# Patient Record
Sex: MALE | Race: ASIAN | NOT HISPANIC OR LATINO | Employment: UNEMPLOYED
[De-identification: names, ages, dates, MRNs, and addresses within clinical notes are randomized per-mention and may not be internally consistent; named-entity substitution may affect disease eponyms.]

---

## 2018-02-21 ENCOUNTER — HOSPITAL (OUTPATIENT)
Dept: OTHER | Age: 38
End: 2018-02-21
Attending: PLASTIC SURGERY

## 2018-02-21 LAB
GLUCOSE BLDC GLUCOMTR-MCNC: 120 MG/DL (ref 65–99)
GLUCOSE BLDC GLUCOMTR-MCNC: 125 MG/DL (ref 65–99)
GLUCOSE BLDC GLUCOMTR-MCNC: 141 MG/DL (ref 65–99)
GLUCOSE BLDC GLUCOMTR-MCNC: 152 MG/DL (ref 65–99)

## 2018-02-22 LAB
GLUCOSE BLDC GLUCOMTR-MCNC: 137 MG/DL (ref 65–99)
GLUCOSE BLDC GLUCOMTR-MCNC: 157 MG/DL (ref 65–99)

## 2020-12-24 ENCOUNTER — HOSPITAL ENCOUNTER (OUTPATIENT)
Dept: GENERAL RADIOLOGY | Age: 40
Discharge: HOME | End: 2020-12-24
Attending: INTERNAL MEDICINE

## 2020-12-24 DIAGNOSIS — R05.9 COUGH: ICD-10-CM

## 2020-12-24 PROCEDURE — 71046 X-RAY EXAM CHEST 2 VIEWS: CPT

## 2021-06-20 ENCOUNTER — HOSPITAL ENCOUNTER (EMERGENCY)
Age: 41
Discharge: HOME | End: 2021-06-20
Attending: FAMILY MEDICINE
Payer: COMMERCIAL

## 2021-06-20 VITALS
DIASTOLIC BLOOD PRESSURE: 78 MMHG | HEIGHT: 65 IN | SYSTOLIC BLOOD PRESSURE: 132 MMHG | WEIGHT: 160 LBS | TEMPERATURE: 98.8 F | BODY MASS INDEX: 26.66 KG/M2 | RESPIRATION RATE: 16 BRPM | HEART RATE: 98 BPM | OXYGEN SATURATION: 99 %

## 2021-06-20 DIAGNOSIS — M25.512 LEFT SHOULDER PAIN, UNSPECIFIED CHRONICITY: ICD-10-CM

## 2021-06-20 DIAGNOSIS — M54.12 CERVICAL RADICULOPATHY: Primary | ICD-10-CM

## 2021-06-20 PROCEDURE — 99283 EMERGENCY DEPT VISIT LOW MDM: CPT

## 2021-06-20 PROCEDURE — 6360000002 HC RX W HCPCS: Performed by: FAMILY MEDICINE

## 2021-06-20 PROCEDURE — 6370000000 HC RX 637 (ALT 250 FOR IP): Performed by: FAMILY MEDICINE

## 2021-06-20 PROCEDURE — 96372 THER/PROPH/DIAG INJ SC/IM: CPT

## 2021-06-20 RX ORDER — CYCLOBENZAPRINE HCL 10 MG
10 TABLET ORAL ONCE
Status: COMPLETED | OUTPATIENT
Start: 2021-06-20 | End: 2021-06-20

## 2021-06-20 RX ORDER — ACETAMINOPHEN 500 MG
1000 TABLET ORAL EVERY 8 HOURS PRN
Qty: 50 TABLET | Refills: 0 | Status: SHIPPED | OUTPATIENT
Start: 2021-06-20 | End: 2021-06-20 | Stop reason: SDUPTHER

## 2021-06-20 RX ORDER — ACETAMINOPHEN 500 MG
1000 TABLET ORAL ONCE
Status: COMPLETED | OUTPATIENT
Start: 2021-06-20 | End: 2021-06-20

## 2021-06-20 RX ORDER — NAPROXEN 500 MG/1
500 TABLET ORAL 2 TIMES DAILY
Qty: 14 TABLET | Refills: 0 | Status: SHIPPED | OUTPATIENT
Start: 2021-06-20 | End: 2021-06-27

## 2021-06-20 RX ORDER — NAPROXEN 500 MG/1
500 TABLET ORAL 2 TIMES DAILY
Qty: 14 TABLET | Refills: 0 | Status: SHIPPED | OUTPATIENT
Start: 2021-06-20 | End: 2021-06-20 | Stop reason: SDUPTHER

## 2021-06-20 RX ORDER — CYCLOBENZAPRINE HCL 10 MG
10 TABLET ORAL 3 TIMES DAILY PRN
Qty: 12 TABLET | Refills: 0 | Status: SHIPPED | OUTPATIENT
Start: 2021-06-20 | End: 2021-06-30

## 2021-06-20 RX ORDER — CYCLOBENZAPRINE HCL 10 MG
10 TABLET ORAL 3 TIMES DAILY PRN
Qty: 12 TABLET | Refills: 0 | Status: SHIPPED | OUTPATIENT
Start: 2021-06-20 | End: 2021-06-20 | Stop reason: SDUPTHER

## 2021-06-20 RX ORDER — ACETAMINOPHEN 500 MG
1000 TABLET ORAL EVERY 8 HOURS PRN
Qty: 50 TABLET | Refills: 0 | Status: SHIPPED | OUTPATIENT
Start: 2021-06-20

## 2021-06-20 RX ORDER — KETOROLAC TROMETHAMINE 30 MG/ML
30 INJECTION, SOLUTION INTRAMUSCULAR; INTRAVENOUS ONCE
Status: COMPLETED | OUTPATIENT
Start: 2021-06-20 | End: 2021-06-20

## 2021-06-20 RX ADMIN — KETOROLAC TROMETHAMINE 30 MG: 30 INJECTION, SOLUTION INTRAMUSCULAR; INTRAVENOUS at 14:19

## 2021-06-20 RX ADMIN — ACETAMINOPHEN 1000 MG: 500 TABLET ORAL at 14:19

## 2021-06-20 RX ADMIN — CYCLOBENZAPRINE 10 MG: 10 TABLET, FILM COATED ORAL at 14:19

## 2021-06-20 ASSESSMENT — PAIN SCALES - GENERAL: PAINLEVEL_OUTOF10: 6

## 2021-06-20 NOTE — ED PROVIDER NOTES
[address] [facility]  Department of Emergency Medicine   ED  Encounter Note  Admit Date/RoomTime: 2021  1:35 PM  ED Room:     NAME: Tamiko Dawson  : 1980  MRN: [id number]     Chief Complaint:  Shoulder Pain (shoulder neck and back pain that started 1 week ago )    History of Present Illness       Tamiko Dawson is a 45 y.o. old male who presents to the emergency department by private vehicle, for non-traumatic Left shoulder pain which occured 1 week(s) prior to arrival.  The complaint is due to no known cause. Patient has no prior history of pain/injury with regards to today's visit. He is right handed. Patient has radiation of pain and also numbness     Since onset the symptoms have been persistent. His pain is aggraveated by any movement, any use of, certain movements or pressure on or palpation of painful area and relieved by rest of injured area, OTC NSAIDS and acetaminophen. He Has left lateral neck pain and numbness of Left thumb    ROS   Pertinent positives and negatives are stated within HPI, all other systems reviewed and are negative. Past Medical History:  has no past medical history on file. Surgical History:  has no past surgical history on file. Social History:      Family History: family history is not on file. Allergies: Patient has no known allergies. Physical Exam   Oxygen Saturation Interpretation: Normal.        ED Triage Vitals   BP Temp Temp Source Pulse Resp SpO2 Height Weight   21 1339 21 1339 21 1339 21 1339 21 1339 21 1339 21 1340 21 1340   (!) 140/74 98.8 °F (37.1 °C) Oral 106 16 96 % 5' 5\" (1.651 m) 160 lb (72.6 kg)         Constitutional:  Alert, development consistent with age. Neck:  Normal ROM. Supple. Left lateral tender limited right rotation which causes more pain on his shoulder. Left Shoulder: anterior, posterior.               Tenderness: none              Swelling: None.            Deformity: no deformity observed/palpated. ROM: full range with pain. Skin:  no wounds, erythema, or swelling. Neurovascular: Motor deficit: none. Sensory deficit: none. Pulse deficit: none. Capillary refill: normal.    Left Elbow:             Tenderness:  none. Swelling: None. Deformity: no deformity observed/palpated. ROM: full painless ROM. Skin:  no wounds, erythema, or swelling. Lymphatics: No lymphangitis or edema noted  DTRs decrease on left biceps  Neurological:  Oriented. Motor functions intact. Lab / Imaging Results   (All laboratory and radiology results have been personally reviewed by myself)  Labs:  No results found for this visit on 06/20/21. Imaging: All Radiology results interpreted by Radiologist unless otherwise noted. No orders to display     ED Course / Medical Decision Making     Medications   ketorolac (TORADOL) injection 30 mg (30 mg Intramuscular Given 6/20/21 1419)   acetaminophen (TYLENOL) tablet 1,000 mg (1,000 mg Oral Given 6/20/21 1419)   cyclobenzaprine (FLEXERIL) tablet 10 mg (10 mg Oral Given 6/20/21 1419)      Consult(s):   None    Procedure(s):   none    MDM:      Imaging was not obtained based on no suspicion for fracture / bony abnormality as per history/physical findings. Plan is subsequently for symptom control, limited use and  immobilization with appropriate outpatient follow-up. Patient has follow-up appointment with his PCP he does not want any imaging that may have to be repeated best for him is to have an MRI of cervical spine and nerve conduction study considering his decreased sensory at thumb left and his relative weakness as compared to his right hand.         Plan of Care/Counseling:  Moises Humphries MD reviewed today's visit with the patient and spouse / life partner in addition to providing specific

## 2023-02-17 ENCOUNTER — HOSPITAL ENCOUNTER (EMERGENCY)
Age: 43
Discharge: HOME | End: 2023-02-17
Attending: EMERGENCY MEDICINE
Payer: COMMERCIAL

## 2023-02-17 VITALS
RESPIRATION RATE: 16 BRPM | TEMPERATURE: 97.3 F | DIASTOLIC BLOOD PRESSURE: 74 MMHG | OXYGEN SATURATION: 100 % | WEIGHT: 160 LBS | BODY MASS INDEX: 26.66 KG/M2 | HEIGHT: 65 IN | SYSTOLIC BLOOD PRESSURE: 122 MMHG | HEART RATE: 88 BPM

## 2023-02-17 DIAGNOSIS — M54.12 CERVICAL RADICULOPATHY: Primary | ICD-10-CM

## 2023-02-17 LAB
EKG ATRIAL RATE: 101 BPM
EKG P AXIS: 51 DEGREES
EKG P-R INTERVAL: 152 MS
EKG Q-T INTERVAL: 340 MS
EKG QRS DURATION: 86 MS
EKG QTC CALCULATION (BAZETT): 440 MS
EKG R AXIS: 53 DEGREES
EKG T AXIS: 17 DEGREES
EKG VENTRICULAR RATE: 101 BPM

## 2023-02-17 PROCEDURE — 6360000002 HC RX W HCPCS: Performed by: EMERGENCY MEDICINE

## 2023-02-17 PROCEDURE — 99284 EMERGENCY DEPT VISIT MOD MDM: CPT

## 2023-02-17 PROCEDURE — 96372 THER/PROPH/DIAG INJ SC/IM: CPT

## 2023-02-17 PROCEDURE — 93005 ELECTROCARDIOGRAM TRACING: CPT

## 2023-02-17 RX ORDER — TESTOSTERONE 200 MG
0.35 PELLET (EA) IMPLANTATION WEEKLY
COMMUNITY

## 2023-02-17 RX ORDER — ORPHENADRINE CITRATE 100 MG/1
100 TABLET, EXTENDED RELEASE ORAL 2 TIMES DAILY
Qty: 20 TABLET | Refills: 0 | Status: SHIPPED | OUTPATIENT
Start: 2023-02-17 | End: 2023-02-27

## 2023-02-17 RX ORDER — LORATADINE 10 MG/1
10 CAPSULE, LIQUID FILLED ORAL DAILY
COMMUNITY

## 2023-02-17 RX ORDER — METHYLPREDNISOLONE 4 MG/1
TABLET ORAL
Qty: 1 KIT | Refills: 0 | Status: SHIPPED | OUTPATIENT
Start: 2023-02-17 | End: 2023-02-23

## 2023-02-17 RX ORDER — PIOGLITAZONEHYDROCHLORIDE 30 MG/1
30 TABLET ORAL DAILY
COMMUNITY

## 2023-02-17 RX ORDER — ORAL SEMAGLUTIDE 14 MG/1
TABLET ORAL 2 TIMES DAILY
COMMUNITY

## 2023-02-17 RX ORDER — HYDROCHLOROTHIAZIDE 12.5 MG/1
12.5 CAPSULE, GELATIN COATED ORAL 2 TIMES DAILY
COMMUNITY

## 2023-02-17 RX ORDER — KETOROLAC TROMETHAMINE 30 MG/ML
30 INJECTION, SOLUTION INTRAMUSCULAR; INTRAVENOUS ONCE
Status: COMPLETED | OUTPATIENT
Start: 2023-02-17 | End: 2023-02-17

## 2023-02-17 RX ORDER — TRAZODONE HYDROCHLORIDE 50 MG/1
50 TABLET ORAL NIGHTLY
COMMUNITY

## 2023-02-17 RX ORDER — MINOCYCLINE HYDROCHLORIDE 100 MG/1
100 CAPSULE ORAL 2 TIMES DAILY
COMMUNITY

## 2023-02-17 RX ORDER — EMPAGLIFLOZIN AND METFORMIN HYDROCHLORIDE 5; 1000 MG/1; MG/1
TABLET ORAL 2 TIMES DAILY
COMMUNITY

## 2023-02-17 RX ORDER — ATORVASTATIN CALCIUM 10 MG/1
10 TABLET, FILM COATED ORAL DAILY
COMMUNITY

## 2023-02-17 RX ORDER — LISINOPRIL 20 MG/1
20 TABLET ORAL DAILY
COMMUNITY

## 2023-02-17 RX ADMIN — KETOROLAC TROMETHAMINE 30 MG: 30 INJECTION, SOLUTION INTRAMUSCULAR at 12:30

## 2023-02-17 ASSESSMENT — PAIN DESCRIPTION - DESCRIPTORS
DESCRIPTORS: ACHING;TIGHTNESS;DISCOMFORT
DESCRIPTORS: ACHING;DISCOMFORT;THROBBING

## 2023-02-17 ASSESSMENT — PAIN DESCRIPTION - ONSET: ONSET: ON-GOING

## 2023-02-17 ASSESSMENT — PAIN DESCRIPTION - FREQUENCY: FREQUENCY: CONTINUOUS

## 2023-02-17 ASSESSMENT — PAIN DESCRIPTION - LOCATION
LOCATION: NECK
LOCATION: NECK
LOCATION: NECK;SHOULDER;ARM

## 2023-02-17 ASSESSMENT — PAIN DESCRIPTION - PAIN TYPE: TYPE: ACUTE PAIN

## 2023-02-17 ASSESSMENT — PAIN SCALES - GENERAL
PAINLEVEL_OUTOF10: 6
PAINLEVEL_OUTOF10: 8
PAINLEVEL_OUTOF10: 8

## 2023-02-17 ASSESSMENT — PAIN DESCRIPTION - ORIENTATION: ORIENTATION: RIGHT

## 2023-02-17 ASSESSMENT — PAIN - FUNCTIONAL ASSESSMENT: PAIN_FUNCTIONAL_ASSESSMENT: 0-10

## 2023-02-17 NOTE — ED PROVIDER NOTES
HPI:  2/17/23, Time: 12:26 PM SAMANTA Pedraza is a 40 y.o. male presenting to the ED for right-sided neck pain beginning about a week and a half ago. Patient denies any falls or specific trauma to his neck but states that it did began after lifting something heavy. No associated numbness or tingling to his arms. Pain is located in the right side of his neck and shoulder radiating down his arm. He had similar symptoms last June. He followed up with his PCP at that time and underwent outpatient CT cervical spine which showed no acute findings. The initial plan was to obtain an MRI of his neck; however, the patient underwent physical therapy with resolution of his symptoms. MRI was not to be needed at that time. He states his symptoms flared up once again about a week and a half ago. He has been taking Motrin and Tylenol with some relief of his symptoms. He denies any fevers, chest pain, difficulty breathing, bowel or bladder incontinence or retention, saddle anesthesia, or extremity numbness or weakness. Review of Systems:  Complete ROS otherwise negative unless stated in HPI.    --------------------------------------------- PAST HISTORY ---------------------------------------------  Past Medical History:  has a past medical history of Degenerative disc disease, lumbar, Diabetes mellitus ([facility] [address] and Hypertension. Past Surgical History:  has a past surgical history that includes Carpal tunnel release; lumbar discectomy; and Mastectomy. Social History:  reports that he has never smoked. He has never used smokeless tobacco. He reports that he does not drink alcohol and does not use drugs. Family History: family history is not on file. The patients home medications have been reviewed. Allergies: Patient has no known allergies.     -------------------------------------------------- RESULTS -------------------------------------------------  All laboratory and radiology results have been personally reviewed by myself   LABS:  Results for orders placed or performed during the hospital encounter of 02/17/23   EKG 12 Lead   Result Value Ref Range    Ventricular Rate 101 BPM    Atrial Rate 101 BPM    P-R Interval 152 ms    QRS Duration 86 ms    Q-T Interval 340 ms    QTc Calculation (Bazett) 440 ms    P Axis 51 degrees    R Axis 53 degrees    T Axis 17 degrees       RADIOLOGY:  Interpreted by Radiologist.  No orders to display       ------------------------- NURSING NOTES AND VITALS REVIEWED ---------------------------   The nursing notes within the ED encounter and vital signs as below have been reviewed. /74   Pulse 88   Temp 97.3 °F (36.3 °C) (Oral)   Resp 16   Ht 5' 5\" (1.651 m)   Wt 160 lb (72.6 kg)   SpO2 100%   BMI 26.63 kg/m²   Oxygen Saturation Interpretation: Normal      ---------------------------------------------------PHYSICAL EXAM--------------------------------------      Constitutional/General: Alert and oriented x3, appears uncomfortable, non toxic in NAD  Head: Normocephalic and atraumatic  Eyes: EOMI  Mouth: Oropharynx clear, handling secretions, no trismus  Neck: Supple, full ROM, No midline cervical spine tenderness, tenderness to right paraspinal muscles and right trapezius, 5/5 strength to bilateral upper extremities with normal sensation to extremities. Strong radial pulses bilaterally  Pulmonary: Lungs clear to auscultation bilaterally, no wheezes, rales, or rhonchi. Not in respiratory distress  Cardiovascular:  Regular rate and rhythm  Extremities: Moves all extremities x 4. Warm and well perfused  Skin: warm and dry without rash  Neurologic: GCS 15, no focal motor or sensory deficits   Psych: Normal Affect.  Behavior normal.      ------------------------------ ED COURSE/MEDICAL DECISION MAKING----------------------  Medications   ketorolac (TORADOL) injection 30 mg (30 mg IntraMUSCular Given 2/17/23 0341)       Medical Decision Making/ED COURSE:    History From: patient     Patient is a 40 y.o. male presenting to the ED for acute on chronic right sided neck pain radiating down his arm, moderate in severity. In the ED, patient was hemodynamically stable and afebrile. On exam, patient had no midline cervical spine tenderness but did have tenderness to his right paraspinal muscles and right trapezius. He was neurovascularly intact. Considered obtaining CT cervical spine. Patient has had no trauma to his neck. He has no midline tenderness. He had similar symptoms last year and had a negative CT neck at that time. I do not believe that repeat CT neck is indicated. I discussed with the patient that MRI is a more appropriate test due to concern for pinched nerve with cervical radiculopathy. He has no signs or symptoms of spinal cord compression, so emergent MRI is not indicated at this time. Through shared decision making, will defer any emergent imaging. Plan for symptomatic control and steroids. I advised that he call his PCP as soon as possible to reestablish with physical therapy and possibly get an outpatient MRI to further evaluate his symptoms. Will be prescribed a Medrol Dosepak and Norflex for home. Advised to monitor his blood sugars while on Medrol Dosepak because it can cause hyperglycemia. Advise no driving while taking Norflex. Supportive care measures and strict ED return precautions discussed. CONSULTS: (Who and What was discussed)  None      Social Determinants of Health : None    Chronic Conditions affecting care:    has a past medical history of Degenerative disc disease, lumbar, Diabetes mellitus ([facility] [address] and Hypertension. ED Course as of 02/17/23 1436   Fri Feb 17, 2023   1321 EKG:   This EKG is signed and interpreted by me, Dr. Cuong Matta MD    Rate: 101  Rhythm: Sinus  Interpretation: Sinus tachycardia, normal AL interval, normal QRS, normal axis, normal QT interval, no acute ST or T wave changes  Comparison: no previous EKG available   [JA]      ED Course User Index  [JA] Hedy Barros MD       Patient remained hemodynamically stable throughout ED course. Discharge Medication List as of 2/17/2023  1:21 PM        START taking these medications    Details   methylPREDNISolone (MEDROL, SHAYAN,) 4 MG tablet Take by mouth., Disp-1 kit, R-0Normal      orphenadrine (NORFLEX) 100 MG extended release tablet Take 1 tablet by mouth 2 times daily for 10 days, Disp-20 tablet, R-0Normal               Counseling: The emergency provider has spoken with the patient and discussed todays results, in addition to providing specific details for the plan of care and counseling regarding the diagnosis and prognosis. Questions are answered at this time and they are agreeable with the plan.      --------------------------------- IMPRESSION AND DISPOSITION ---------------------------------    IMPRESSION  1. Cervical radiculopathy        DISPOSITION  Disposition: Discharge to home  Patient condition is stable      NOTE: This report was transcribed using voice recognition software.  Every effort was made to ensure accuracy; however, inadvertent computerized transcription errors may be present    I, Hedy Barros MD, am the primary provider of this record        Hedy Barros MD  02/17/23 9714

## 2024-09-01 ENCOUNTER — HOSPITAL ENCOUNTER (EMERGENCY)
Age: 44
Discharge: HOME | End: 2024-09-01
Payer: COMMERCIAL

## 2024-09-01 ENCOUNTER — APPOINTMENT (OUTPATIENT)
Dept: GENERAL RADIOLOGY | Age: 44
End: 2024-09-01
Payer: COMMERCIAL

## 2024-09-01 VITALS
BODY MASS INDEX: 26.66 KG/M2 | WEIGHT: 160 LBS | DIASTOLIC BLOOD PRESSURE: 87 MMHG | RESPIRATION RATE: 18 BRPM | TEMPERATURE: 98.6 F | SYSTOLIC BLOOD PRESSURE: 139 MMHG | OXYGEN SATURATION: 100 % | HEIGHT: 65 IN | HEART RATE: 93 BPM

## 2024-09-01 DIAGNOSIS — R42 DIZZINESS: Primary | ICD-10-CM

## 2024-09-01 DIAGNOSIS — E87.6 HYPOKALEMIA: ICD-10-CM

## 2024-09-01 DIAGNOSIS — R25.2 MUSCLE CRAMPING: ICD-10-CM

## 2024-09-01 LAB
ALBUMIN SERPL-MCNC: 4.9 G/DL (ref 3.5–5.2)
ALP SERPL-CCNC: 59 U/L (ref 40–129)
ALT SERPL-CCNC: 26 U/L (ref 0–40)
ANION GAP SERPL CALCULATED.3IONS-SCNC: 16 MMOL/L (ref 7–16)
AST SERPL-CCNC: 27 U/L (ref 0–39)
BASOPHILS # BLD: 0.04 K/UL (ref 0–0.2)
BASOPHILS NFR BLD: 1 % (ref 0–2)
BILIRUB SERPL-MCNC: 0.5 MG/DL (ref 0–1.2)
BILIRUB UR QL STRIP: NEGATIVE
BUN SERPL-MCNC: 25 MG/DL (ref 6–20)
CALCIUM SERPL-MCNC: 9.4 MG/DL (ref 8.6–10.2)
CHLORIDE SERPL-SCNC: 97 MMOL/L (ref 98–107)
CK SERPL-CCNC: 530 U/L (ref 20–200)
CLARITY UR: CLEAR
CO2 SERPL-SCNC: 22 MMOL/L (ref 22–29)
COLOR UR: YELLOW
CREAT SERPL-MCNC: 0.9 MG/DL (ref 0.7–1.2)
D-DIMER QUANTITATIVE: <200 NG/ML DDU (ref 0–230)
EOSINOPHIL # BLD: 0.08 K/UL (ref 0.05–0.5)
EOSINOPHILS RELATIVE PERCENT: 1 % (ref 0–6)
ERYTHROCYTE [DISTWIDTH] IN BLOOD BY AUTOMATED COUNT: 12 % (ref 11.5–15)
GFR, ESTIMATED: >90 ML/MIN/1.73M2
GLUCOSE SERPL-MCNC: 90 MG/DL (ref 74–99)
GLUCOSE UR STRIP-MCNC: >=1000 MG/DL
HCT VFR BLD AUTO: 45.4 % (ref 37–54)
HGB BLD-MCNC: 15.4 G/DL (ref 12.5–16.5)
HGB UR QL STRIP.AUTO: ABNORMAL
IMM GRANULOCYTES # BLD AUTO: <0.03 K/UL (ref 0–0.58)
IMM GRANULOCYTES NFR BLD: 0 % (ref 0–5)
KETONES UR STRIP-MCNC: 15 MG/DL
LACTATE BLDV-SCNC: 1 MMOL/L (ref 0.5–2.2)
LEUKOCYTE ESTERASE UR QL STRIP: NEGATIVE
LYMPHOCYTES NFR BLD: 1.44 K/UL (ref 1.5–4)
LYMPHOCYTES RELATIVE PERCENT: 21 % (ref 20–42)
MAGNESIUM SERPL-MCNC: 2.1 MG/DL (ref 1.6–2.6)
MCH RBC QN AUTO: 30.7 PG (ref 26–35)
MCHC RBC AUTO-ENTMCNC: 33.9 G/DL (ref 32–34.5)
MCV RBC AUTO: 90.6 FL (ref 80–99.9)
MONOCYTES NFR BLD: 0.75 K/UL (ref 0.1–0.95)
MONOCYTES NFR BLD: 11 % (ref 2–12)
NEUTROPHILS NFR BLD: 67 % (ref 43–80)
NEUTS SEG NFR BLD: 4.65 K/UL (ref 1.8–7.3)
NITRITE UR QL STRIP: NEGATIVE
PH UR STRIP: 5.5 [PH] (ref 5–9)
PLATELET # BLD AUTO: 258 K/UL (ref 130–450)
PMV BLD AUTO: 9.6 FL (ref 7–12)
POTASSIUM SERPL-SCNC: 3.3 MMOL/L (ref 3.5–5)
PROT SERPL-MCNC: 7.8 G/DL (ref 6.4–8.3)
PROT UR STRIP-MCNC: NEGATIVE MG/DL
RBC # BLD AUTO: 5.01 M/UL (ref 3.8–5.8)
RBC #/AREA URNS HPF: NORMAL /HPF
SODIUM SERPL-SCNC: 135 MMOL/L (ref 132–146)
SP GR UR STRIP: 1.02 (ref 1–1.03)
TROPONIN I SERPL HS-MCNC: 6 NG/L (ref 0–11)
UROBILINOGEN UR STRIP-ACNC: 0.2 EU/DL (ref 0–1)
WBC #/AREA URNS HPF: NORMAL /HPF
WBC OTHER # BLD: 7 K/UL (ref 4.5–11.5)

## 2024-09-01 PROCEDURE — 84484 ASSAY OF TROPONIN QUANT: CPT

## 2024-09-01 PROCEDURE — 93005 ELECTROCARDIOGRAM TRACING: CPT | Performed by: NURSE PRACTITIONER

## 2024-09-01 PROCEDURE — 80053 COMPREHEN METABOLIC PANEL: CPT

## 2024-09-01 PROCEDURE — 2580000003 HC RX 258: Performed by: NURSE PRACTITIONER

## 2024-09-01 PROCEDURE — 81001 URINALYSIS AUTO W/SCOPE: CPT

## 2024-09-01 PROCEDURE — 82550 ASSAY OF CK (CPK): CPT

## 2024-09-01 PROCEDURE — 71046 X-RAY EXAM CHEST 2 VIEWS: CPT

## 2024-09-01 PROCEDURE — 99285 EMERGENCY DEPT VISIT HI MDM: CPT

## 2024-09-01 PROCEDURE — 83735 ASSAY OF MAGNESIUM: CPT

## 2024-09-01 PROCEDURE — 85379 FIBRIN DEGRADATION QUANT: CPT

## 2024-09-01 PROCEDURE — 85025 COMPLETE CBC W/AUTO DIFF WBC: CPT

## 2024-09-01 PROCEDURE — 6370000000 HC RX 637 (ALT 250 FOR IP): Performed by: NURSE PRACTITIONER

## 2024-09-01 PROCEDURE — 83605 ASSAY OF LACTIC ACID: CPT

## 2024-09-01 RX ORDER — IBUPROFEN 800 MG/1
800 TABLET, FILM COATED ORAL EVERY 6 HOURS PRN
Qty: 21 TABLET | Refills: 0 | Status: SHIPPED | OUTPATIENT
Start: 2024-09-01

## 2024-09-01 RX ORDER — METHOCARBAMOL 500 MG/1
500 TABLET, FILM COATED ORAL 4 TIMES DAILY
Qty: 20 TABLET | Refills: 0 | Status: SHIPPED | OUTPATIENT
Start: 2024-09-01 | End: 2024-09-06

## 2024-09-01 RX ORDER — POTASSIUM CHLORIDE 1500 MG/1
20 TABLET, EXTENDED RELEASE ORAL ONCE
Status: COMPLETED | OUTPATIENT
Start: 2024-09-01 | End: 2024-09-01

## 2024-09-01 RX ORDER — 0.9 % SODIUM CHLORIDE 0.9 %
1000 INTRAVENOUS SOLUTION INTRAVENOUS ONCE
Status: COMPLETED | OUTPATIENT
Start: 2024-09-01 | End: 2024-09-01

## 2024-09-01 RX ADMIN — SODIUM CHLORIDE 1000 ML: 9 INJECTION, SOLUTION INTRAVENOUS at 18:36

## 2024-09-01 RX ADMIN — POTASSIUM CHLORIDE 20 MEQ: 1500 TABLET, EXTENDED RELEASE ORAL at 18:35

## 2024-09-01 ASSESSMENT — PAIN SCALES - GENERAL: PAINLEVEL_OUTOF10: 0

## 2024-09-01 ASSESSMENT — PAIN - FUNCTIONAL ASSESSMENT: PAIN_FUNCTIONAL_ASSESSMENT: NONE - DENIES PAIN

## 2024-09-01 ASSESSMENT — LIFESTYLE VARIABLES
HOW MANY STANDARD DRINKS CONTAINING ALCOHOL DO YOU HAVE ON A TYPICAL DAY: PATIENT DOES NOT DRINK
HOW OFTEN DO YOU HAVE A DRINK CONTAINING ALCOHOL: NEVER

## 2024-09-01 NOTE — ED PROVIDER NOTES
[facility] EMERGENCY DEPARTMENT  ED  Encounter Note  Admit Date/RoomTime: 9/1/2024  5:00 PM  ED Room: 03/03  Independent SHRADDHA Visit.     HPI:  9/1/24,   Time: 6:40 PM EDT         Valeria Cummins is a 44 y.o. adult presenting to the ED for muscle cramping, beginning few months ago.  The complaint has been intermittent, mild in severity, and worsened by nothing.  Patient presents with complaints of intermittent episodes of muscle cramping in the bilateral lower legs which has been present for the last couple months.  Also states he had intermittent episodes of dizziness which has been intermittent.  He states earlier today while at work he began having some increased dizziness.  Denies any chest pain or shortness of breath.    ROS:   Pertinent positives and negatives are stated within HPI, all other systems reviewed and are negative.  --------------------------------------------- PAST HISTORY ---------------------------------------------  Past Medical History:  has a past medical history of Degenerative disc disease, lumbar, Diabetes mellitus (HCC), and Hypertension.    Past Surgical History:  has a past surgical history that includes Carpal tunnel release; lumbar discectomy; and Mastectomy.    Social History:  reports that he has never smoked. He has never used smokeless tobacco. He reports that he does not drink alcohol and does not use drugs.    Family History: family history is not on file.     The patient’s home medications have been reviewed.    Allergies: Patient has no known allergies.    -------------------------------------------------- RESULTS -------------------------------------------------  All laboratory and radiology results have been personally reviewed by myself   LABS:  Results for orders placed or performed during the hospital encounter of 09/01/24   Troponin   Result Value Ref Range    Troponin, High Sensitivity 6 0 - 11 ng/L   CBC with Auto Differential   Result

## 2024-09-04 LAB
EKG ATRIAL RATE: 90 BPM
EKG P AXIS: 58 DEGREES
EKG P-R INTERVAL: 160 MS
EKG Q-T INTERVAL: 366 MS
EKG QRS DURATION: 72 MS
EKG QTC CALCULATION (BAZETT): 447 MS
EKG R AXIS: 58 DEGREES
EKG T AXIS: 47 DEGREES
EKG VENTRICULAR RATE: 90 BPM

## 2024-09-04 PROCEDURE — 93010 ELECTROCARDIOGRAM REPORT: CPT | Performed by: INTERNAL MEDICINE

## 2024-09-05 ENCOUNTER — HOSPITAL ENCOUNTER (EMERGENCY)
Age: 44
Discharge: HOME | End: 2024-09-06
Attending: STUDENT IN AN ORGANIZED HEALTH CARE EDUCATION/TRAINING PROGRAM
Payer: COMMERCIAL

## 2024-09-05 ENCOUNTER — APPOINTMENT (OUTPATIENT)
Dept: CT IMAGING | Age: 44
End: 2024-09-05
Payer: COMMERCIAL

## 2024-09-05 DIAGNOSIS — N30.00 ACUTE CYSTITIS WITHOUT HEMATURIA: Primary | ICD-10-CM

## 2024-09-05 DIAGNOSIS — E87.6 HYPOKALEMIA: ICD-10-CM

## 2024-09-05 LAB
ALBUMIN SERPL-MCNC: 4 G/DL (ref 3.5–5.2)
ALP SERPL-CCNC: 51 U/L (ref 40–129)
ALT SERPL-CCNC: 31 U/L (ref 0–40)
ANION GAP SERPL CALCULATED.3IONS-SCNC: 12 MMOL/L (ref 7–16)
AST SERPL-CCNC: 29 U/L (ref 0–39)
BACTERIA URNS QL MICRO: ABNORMAL
BASOPHILS # BLD: 0.07 K/UL (ref 0–0.2)
BASOPHILS NFR BLD: 1 % (ref 0–2)
BILIRUB SERPL-MCNC: 0.2 MG/DL (ref 0–1.2)
BILIRUB UR QL STRIP: NEGATIVE
BNP SERPL-MCNC: <26 PG/ML (ref 0–125)
BUN SERPL-MCNC: 19 MG/DL (ref 6–20)
CALCIUM SERPL-MCNC: 8.9 MG/DL (ref 8.6–10.2)
CHLORIDE SERPL-SCNC: 103 MMOL/L (ref 98–107)
CK SERPL-CCNC: 577 U/L (ref 20–200)
CLARITY UR: CLEAR
CO2 SERPL-SCNC: 24 MMOL/L (ref 22–29)
COLOR UR: YELLOW
CREAT SERPL-MCNC: 1 MG/DL (ref 0.7–1.2)
EOSINOPHIL # BLD: 0.22 K/UL (ref 0.05–0.5)
EOSINOPHILS RELATIVE PERCENT: 3 % (ref 0–6)
ERYTHROCYTE [DISTWIDTH] IN BLOOD BY AUTOMATED COUNT: 12 % (ref 11.5–15)
GFR, ESTIMATED: >90 ML/MIN/1.73M2
GLUCOSE SERPL-MCNC: 144 MG/DL (ref 74–99)
GLUCOSE UR STRIP-MCNC: >=1000 MG/DL
HCT VFR BLD AUTO: 39.9 % (ref 37–54)
HGB BLD-MCNC: 13.7 G/DL (ref 12.5–16.5)
HGB UR QL STRIP.AUTO: NEGATIVE
IMM GRANULOCYTES # BLD AUTO: <0.03 K/UL (ref 0–0.58)
IMM GRANULOCYTES NFR BLD: 0 % (ref 0–5)
KETONES UR STRIP-MCNC: ABNORMAL MG/DL
LEUKOCYTE ESTERASE UR QL STRIP: ABNORMAL
LYMPHOCYTES NFR BLD: 1.58 K/UL (ref 1.5–4)
LYMPHOCYTES RELATIVE PERCENT: 23 % (ref 20–42)
MAGNESIUM SERPL-MCNC: 2.1 MG/DL (ref 1.6–2.6)
MCH RBC QN AUTO: 30.6 PG (ref 26–35)
MCHC RBC AUTO-ENTMCNC: 34.3 G/DL (ref 32–34.5)
MCV RBC AUTO: 89.1 FL (ref 80–99.9)
MONOCYTES NFR BLD: 0.73 K/UL (ref 0.1–0.95)
MONOCYTES NFR BLD: 11 % (ref 2–12)
NEUTROPHILS NFR BLD: 62 % (ref 43–80)
NEUTS SEG NFR BLD: 4.24 K/UL (ref 1.8–7.3)
NITRITE UR QL STRIP: NEGATIVE
PH UR STRIP: 6.5 [PH] (ref 5–9)
PLATELET # BLD AUTO: 260 K/UL (ref 130–450)
PMV BLD AUTO: 9.7 FL (ref 7–12)
POTASSIUM SERPL-SCNC: 3.4 MMOL/L (ref 3.5–5)
PROT SERPL-MCNC: 6.3 G/DL (ref 6.4–8.3)
PROT UR STRIP-MCNC: NEGATIVE MG/DL
RBC # BLD AUTO: 4.48 M/UL (ref 3.8–5.8)
RBC #/AREA URNS HPF: ABNORMAL /HPF
SODIUM SERPL-SCNC: 139 MMOL/L (ref 132–146)
SP GR UR STRIP: 1.02 (ref 1–1.03)
TROPONIN I SERPL HS-MCNC: 9 NG/L (ref 0–11)
UROBILINOGEN UR STRIP-ACNC: 0.2 EU/DL (ref 0–1)
WBC #/AREA URNS HPF: ABNORMAL /HPF
WBC OTHER # BLD: 6.9 K/UL (ref 4.5–11.5)

## 2024-09-05 PROCEDURE — 85025 COMPLETE CBC W/AUTO DIFF WBC: CPT

## 2024-09-05 PROCEDURE — 84484 ASSAY OF TROPONIN QUANT: CPT

## 2024-09-05 PROCEDURE — 83880 ASSAY OF NATRIURETIC PEPTIDE: CPT

## 2024-09-05 PROCEDURE — 80053 COMPREHEN METABOLIC PANEL: CPT

## 2024-09-05 PROCEDURE — 87636 SARSCOV2 & INF A&B AMP PRB: CPT

## 2024-09-05 PROCEDURE — 84439 ASSAY OF FREE THYROXINE: CPT

## 2024-09-05 PROCEDURE — 99284 EMERGENCY DEPT VISIT MOD MDM: CPT

## 2024-09-05 PROCEDURE — 84443 ASSAY THYROID STIM HORMONE: CPT

## 2024-09-05 PROCEDURE — 80307 DRUG TEST PRSMV CHEM ANLYZR: CPT

## 2024-09-05 PROCEDURE — 70450 CT HEAD/BRAIN W/O DYE: CPT

## 2024-09-05 PROCEDURE — 82550 ASSAY OF CK (CPK): CPT

## 2024-09-05 PROCEDURE — 93005 ELECTROCARDIOGRAM TRACING: CPT | Performed by: STUDENT IN AN ORGANIZED HEALTH CARE EDUCATION/TRAINING PROGRAM

## 2024-09-05 PROCEDURE — 80179 DRUG ASSAY SALICYLATE: CPT

## 2024-09-05 PROCEDURE — 81001 URINALYSIS AUTO W/SCOPE: CPT

## 2024-09-05 PROCEDURE — 80143 DRUG ASSAY ACETAMINOPHEN: CPT

## 2024-09-05 PROCEDURE — 83735 ASSAY OF MAGNESIUM: CPT

## 2024-09-05 PROCEDURE — G0480 DRUG TEST DEF 1-7 CLASSES: HCPCS

## 2024-09-05 RX ORDER — 0.9 % SODIUM CHLORIDE 0.9 %
1000 INTRAVENOUS SOLUTION INTRAVENOUS ONCE
Status: COMPLETED | OUTPATIENT
Start: 2024-09-06 | End: 2024-09-06

## 2024-09-05 ASSESSMENT — PAIN - FUNCTIONAL ASSESSMENT: PAIN_FUNCTIONAL_ASSESSMENT: NONE - DENIES PAIN

## 2024-09-06 VITALS
BODY MASS INDEX: 26.66 KG/M2 | SYSTOLIC BLOOD PRESSURE: 132 MMHG | WEIGHT: 160 LBS | RESPIRATION RATE: 20 BRPM | DIASTOLIC BLOOD PRESSURE: 63 MMHG | HEART RATE: 88 BPM | OXYGEN SATURATION: 99 % | HEIGHT: 65 IN | TEMPERATURE: 98.7 F

## 2024-09-06 LAB
AMPHET UR QL SCN: NEGATIVE
APAP SERPL-MCNC: <5 UG/ML (ref 10–30)
BARBITURATES UR QL SCN: NEGATIVE
BENZODIAZ UR QL: NEGATIVE
BUPRENORPHINE UR QL: NEGATIVE
CANNABINOIDS UR QL SCN: POSITIVE
COCAINE UR QL SCN: NEGATIVE
EKG ATRIAL RATE: 93 BPM
EKG P AXIS: 51 DEGREES
EKG P-R INTERVAL: 146 MS
EKG Q-T INTERVAL: 364 MS
EKG QRS DURATION: 86 MS
EKG QTC CALCULATION (BAZETT): 452 MS
EKG R AXIS: 41 DEGREES
EKG T AXIS: 33 DEGREES
EKG VENTRICULAR RATE: 93 BPM
ETHANOLAMINE SERPL-MCNC: <10 MG/DL (ref 0–0.08)
FENTANYL UR QL: NEGATIVE
FLUAV RNA RESP QL NAA+PROBE: NOT DETECTED
FLUBV RNA RESP QL NAA+PROBE: NOT DETECTED
METHADONE UR QL: NEGATIVE
OPIATES UR QL SCN: NEGATIVE
OXYCODONE UR QL SCN: NEGATIVE
PCP UR QL SCN: NEGATIVE
SALICYLATES SERPL-MCNC: <0.3 MG/DL (ref 0–30)
SARS-COV-2 RNA RESP QL NAA+PROBE: NOT DETECTED
SOURCE: NORMAL
SPECIMEN DESCRIPTION: NORMAL
T4 FREE SERPL-MCNC: 1.1 NG/DL (ref 0.9–1.7)
TEST INFORMATION: ABNORMAL
TOXIC TRICYCLIC SC,BLOOD: NEGATIVE
TROPONIN I SERPL HS-MCNC: 9 NG/L (ref 0–11)
TSH SERPL DL<=0.05 MIU/L-ACNC: 2.36 UIU/ML (ref 0.27–4.2)

## 2024-09-06 PROCEDURE — 87086 URINE CULTURE/COLONY COUNT: CPT

## 2024-09-06 PROCEDURE — 93010 ELECTROCARDIOGRAM REPORT: CPT | Performed by: INTERNAL MEDICINE

## 2024-09-06 PROCEDURE — 2580000003 HC RX 258: Performed by: STUDENT IN AN ORGANIZED HEALTH CARE EDUCATION/TRAINING PROGRAM

## 2024-09-06 PROCEDURE — 6370000000 HC RX 637 (ALT 250 FOR IP): Performed by: STUDENT IN AN ORGANIZED HEALTH CARE EDUCATION/TRAINING PROGRAM

## 2024-09-06 PROCEDURE — 84484 ASSAY OF TROPONIN QUANT: CPT

## 2024-09-06 RX ORDER — ACETAMINOPHEN 500 MG
1000 TABLET ORAL ONCE
Status: COMPLETED | OUTPATIENT
Start: 2024-09-06 | End: 2024-09-06

## 2024-09-06 RX ORDER — CEFDINIR 300 MG/1
300 CAPSULE ORAL 2 TIMES DAILY
Qty: 10 CAPSULE | Refills: 0 | Status: SHIPPED | OUTPATIENT
Start: 2024-09-06 | End: 2024-09-11

## 2024-09-06 RX ORDER — CEFDINIR 300 MG/1
300 CAPSULE ORAL 2 TIMES DAILY
Qty: 14 CAPSULE | Refills: 0 | Status: SHIPPED | OUTPATIENT
Start: 2024-09-06 | End: 2024-09-06

## 2024-09-06 RX ORDER — POTASSIUM CHLORIDE 750 MG/1
10 TABLET, EXTENDED RELEASE ORAL DAILY
Qty: 7 TABLET | Refills: 0 | Status: SHIPPED | OUTPATIENT
Start: 2024-09-06 | End: 2024-09-13

## 2024-09-06 RX ORDER — CEFDINIR 300 MG/1
300 CAPSULE ORAL ONCE
Status: COMPLETED | OUTPATIENT
Start: 2024-09-06 | End: 2024-09-06

## 2024-09-06 RX ADMIN — ACETAMINOPHEN 1000 MG: 500 TABLET ORAL at 00:30

## 2024-09-06 RX ADMIN — SODIUM CHLORIDE 1000 ML: 9 INJECTION, SOLUTION INTRAVENOUS at 00:17

## 2024-09-06 RX ADMIN — CEFDINIR 300 MG: 300 CAPSULE ORAL at 02:16

## 2024-09-06 RX ADMIN — POTASSIUM BICARBONATE 40 MEQ: 782 TABLET, EFFERVESCENT ORAL at 00:16

## 2024-09-06 ASSESSMENT — PAIN DESCRIPTION - ORIENTATION
ORIENTATION: POSTERIOR
ORIENTATION: POSTERIOR

## 2024-09-06 ASSESSMENT — PAIN DESCRIPTION - ONSET
ONSET: ON-GOING
ONSET: ON-GOING

## 2024-09-06 ASSESSMENT — PAIN SCALES - GENERAL
PAINLEVEL_OUTOF10: 5
PAINLEVEL_OUTOF10: 4

## 2024-09-06 ASSESSMENT — PAIN - FUNCTIONAL ASSESSMENT
PAIN_FUNCTIONAL_ASSESSMENT: ACTIVITIES ARE NOT PREVENTED
PAIN_FUNCTIONAL_ASSESSMENT: ACTIVITIES ARE NOT PREVENTED
PAIN_FUNCTIONAL_ASSESSMENT: 0-10
PAIN_FUNCTIONAL_ASSESSMENT: NONE - DENIES PAIN
PAIN_FUNCTIONAL_ASSESSMENT: 0-10

## 2024-09-06 ASSESSMENT — PAIN DESCRIPTION - PAIN TYPE
TYPE: ACUTE PAIN
TYPE: ACUTE PAIN

## 2024-09-06 ASSESSMENT — PAIN DESCRIPTION - LOCATION
LOCATION: HEAD
LOCATION: HEAD

## 2024-09-06 ASSESSMENT — PAIN DESCRIPTION - FREQUENCY
FREQUENCY: CONTINUOUS
FREQUENCY: CONTINUOUS

## 2024-09-06 ASSESSMENT — PAIN DESCRIPTION - DESCRIPTORS
DESCRIPTORS: ACHING;SORE;THROBBING
DESCRIPTORS: ACHING;THROBBING;SORE

## 2024-09-06 NOTE — ED PROVIDER NOTES
XR CHEST (2 VW)    Result Date: 9/1/2024  EXAMINATION: TWO XRAY VIEWS OF THE CHEST 9/1/2024 5:27 pm COMPARISON: None. HISTORY: ORDERING SYSTEM PROVIDED HISTORY: cp TECHNOLOGIST PROVIDED HISTORY: Reason for exam:->cp FINDINGS: Adequate and symmetric aeration of the lungs. There are no formed consolidations, pleural effusions, or pneumothoraces. Trachea and central mainstem bronchi appear clear. The cardiomediastinal silhouette and pulmonary vascularity appear within normal limits. Osseous and thoracic soft tissue structures demonstrate no acute findings.     No evidence of active cardiopulmonary pathology.       No results found.    PROCEDURES   Unless otherwise noted below, none     CRITICAL CARE TIME (.cct)   Per attending attestation    EMERGENCY DEPARTMENT COURSE    Vitals:    Vitals:    09/05/24 2319 09/06/24 0023 09/06/24 0050 09/06/24 0217   BP:  128/78 (!) 150/91 132/63   Pulse: 83 90 82 88   Resp:  20 22 20   Temp:       SpO2:  99% 100% 99%   Weight:       Height:           Patient was given the following medications:  Medications   sodium chloride 0.9 % bolus 1,000 mL (0 mLs IntraVENous Stopped 9/6/24 0200)   potassium bicarb-citric acid (EFFER-K) effervescent tablet 40 mEq (40 mEq Oral Given 9/6/24 0016)   acetaminophen (TYLENOL) tablet 1,000 mg (1,000 mg Oral Given 9/6/24 0030)   cefdinir (OMNICEF) capsule 300 mg (300 mg Oral Given 9/6/24 0216)         Is this patient to be included in the SEP-1 core measure due to severe sepsis or septic shock? No Exclusion criteria - the patient is NOT to be included for SEP-1 Core Measure due to: 2+ SIRS criteria are not met        Medical Decision Making/Differential Diagnosis:    CC/HPI Summary, Social Determinants of health, Records Reviewed, DDx, testing done/not done, ED Course, Reassessment, disposition considerations/shared decision making with patient, consults, disposition:            Chronic Conditions:   Past Medical History:   Diagnosis Date     agreement plan of care discharged in stable condition.    FINAL IMPRESSION      1. Acute cystitis without hematuria    2. Hypokalemia          DISPOSITION/PLAN     DISPOSITION Decision To Discharge 09/06/2024 02:03:57 AM    PATIENT REFERRED TO:  No follow-up provider specified.    DISCHARGE MEDICATIONS:  Discharge Medication List as of 9/6/2024  2:05 AM        START taking these medications    Details   potassium chloride (KLOR-CON M) 10 MEQ extended release tablet Take 1 tablet by mouth daily for 7 days, Disp-7 tablet, R-0Normal             DISCONTINUED MEDICATIONS:  Discharge Medication List as of 9/6/2024  2:05 AM               (Please note that portions of this note were completed with a voice recognition program.  Efforts were made to edit the dictations but occasionally words are mis-transcribed.)    Charles Skaggs DO (electronically signed)       Charles Skaggs DO  09/07/24 0946

## 2024-09-06 NOTE — DISCHARGE INSTRUCTIONS
Increase fluids  Take all medication as prescribed  Follow-up with primary care doctor  If you notice any new worrisome symptoms please return to the emergency department for evaluation

## 2024-09-07 LAB
MICROORGANISM SPEC CULT: ABNORMAL
SERVICE CMNT-IMP: ABNORMAL
SPECIMEN DESCRIPTION: ABNORMAL

## 2024-11-30 ENCOUNTER — APPOINTMENT (OUTPATIENT)
Dept: CT IMAGING | Age: 44
End: 2024-11-30
Payer: COMMERCIAL

## 2024-11-30 ENCOUNTER — HOSPITAL ENCOUNTER (EMERGENCY)
Age: 44
Discharge: HOME | End: 2024-11-30
Attending: EMERGENCY MEDICINE
Payer: COMMERCIAL

## 2024-11-30 VITALS
TEMPERATURE: 98 F | HEART RATE: 98 BPM | WEIGHT: 160 LBS | DIASTOLIC BLOOD PRESSURE: 74 MMHG | RESPIRATION RATE: 16 BRPM | OXYGEN SATURATION: 96 % | BODY MASS INDEX: 26.63 KG/M2 | SYSTOLIC BLOOD PRESSURE: 128 MMHG

## 2024-11-30 DIAGNOSIS — R11.2 NAUSEA AND VOMITING, UNSPECIFIED VOMITING TYPE: ICD-10-CM

## 2024-11-30 DIAGNOSIS — R10.13 EPIGASTRIC PAIN: Primary | ICD-10-CM

## 2024-11-30 DIAGNOSIS — I70.0 AORTIC ATHEROSCLEROSIS (HCC): ICD-10-CM

## 2024-11-30 DIAGNOSIS — R19.7 DIARRHEA, UNSPECIFIED TYPE: ICD-10-CM

## 2024-11-30 LAB
ALBUMIN SERPL-MCNC: 4.5 G/DL (ref 3.5–5.2)
ALP SERPL-CCNC: 62 U/L (ref 40–129)
ALT SERPL-CCNC: 21 U/L (ref 0–40)
ANION GAP SERPL CALCULATED.3IONS-SCNC: 13 MMOL/L (ref 7–16)
AST SERPL-CCNC: 21 U/L (ref 0–39)
BASOPHILS # BLD: 0.04 K/UL (ref 0–0.2)
BASOPHILS NFR BLD: 0 % (ref 0–2)
BILIRUB SERPL-MCNC: 0.4 MG/DL (ref 0–1.2)
BILIRUB UR QL STRIP: NEGATIVE
BUN SERPL-MCNC: 23 MG/DL (ref 6–20)
CALCIUM SERPL-MCNC: 9.2 MG/DL (ref 8.6–10.2)
CHLORIDE SERPL-SCNC: 99 MMOL/L (ref 98–107)
CLARITY UR: CLEAR
CO2 SERPL-SCNC: 25 MMOL/L (ref 22–29)
COLOR UR: YELLOW
CREAT SERPL-MCNC: 0.7 MG/DL (ref 0.7–1.2)
EOSINOPHIL # BLD: 0.04 K/UL (ref 0.05–0.5)
EOSINOPHILS RELATIVE PERCENT: 0 % (ref 0–6)
ERYTHROCYTE [DISTWIDTH] IN BLOOD BY AUTOMATED COUNT: 12 % (ref 11.5–15)
FLUAV RNA RESP QL NAA+PROBE: NOT DETECTED
FLUBV RNA RESP QL NAA+PROBE: NOT DETECTED
GFR, ESTIMATED: >90 ML/MIN/1.73M2
GLUCOSE SERPL-MCNC: 80 MG/DL (ref 74–99)
GLUCOSE UR STRIP-MCNC: >=1000 MG/DL
HCG UR QL: NEGATIVE
HCT VFR BLD AUTO: 48.8 % (ref 37–54)
HGB BLD-MCNC: 16.2 G/DL (ref 12.5–16.5)
HGB UR QL STRIP.AUTO: NEGATIVE
IMM GRANULOCYTES # BLD AUTO: 0.04 K/UL (ref 0–0.58)
IMM GRANULOCYTES NFR BLD: 0 % (ref 0–5)
KETONES UR STRIP-MCNC: ABNORMAL MG/DL
LACTATE BLDV-SCNC: 1.3 MMOL/L (ref 0.5–2.2)
LEUKOCYTE ESTERASE UR QL STRIP: NEGATIVE
LIPASE SERPL-CCNC: 40 U/L (ref 13–60)
LYMPHOCYTES NFR BLD: 0.45 K/UL (ref 1.5–4)
LYMPHOCYTES RELATIVE PERCENT: 4 % (ref 20–42)
MAGNESIUM SERPL-MCNC: 1.8 MG/DL (ref 1.6–2.6)
MCH RBC QN AUTO: 30.5 PG (ref 26–35)
MCHC RBC AUTO-ENTMCNC: 33.2 G/DL (ref 32–34.5)
MCV RBC AUTO: 91.7 FL (ref 80–99.9)
MONOCYTES NFR BLD: 0.47 K/UL (ref 0.1–0.95)
MONOCYTES NFR BLD: 4 % (ref 2–12)
NEUTROPHILS NFR BLD: 92 % (ref 43–80)
NEUTS SEG NFR BLD: 11.11 K/UL (ref 1.8–7.3)
NITRITE UR QL STRIP: NEGATIVE
PH UR STRIP: 6 [PH] (ref 5–9)
PLATELET # BLD AUTO: 256 K/UL (ref 130–450)
PMV BLD AUTO: 9.7 FL (ref 7–12)
POTASSIUM SERPL-SCNC: 3.7 MMOL/L (ref 3.5–5)
PROT SERPL-MCNC: 7.4 G/DL (ref 6.4–8.3)
PROT UR STRIP-MCNC: NEGATIVE MG/DL
RBC # BLD AUTO: 5.32 M/UL (ref 3.8–5.8)
RBC #/AREA URNS HPF: ABNORMAL /HPF
SARS-COV-2 RNA RESP QL NAA+PROBE: NOT DETECTED
SODIUM SERPL-SCNC: 137 MMOL/L (ref 132–146)
SOURCE: NORMAL
SP GR UR STRIP: 1.02 (ref 1–1.03)
SPECIMEN DESCRIPTION: NORMAL
UROBILINOGEN UR STRIP-ACNC: 0.2 EU/DL (ref 0–1)
WBC #/AREA URNS HPF: ABNORMAL /HPF
WBC OTHER # BLD: 12.2 K/UL (ref 4.5–11.5)

## 2024-11-30 PROCEDURE — 83605 ASSAY OF LACTIC ACID: CPT

## 2024-11-30 PROCEDURE — 80053 COMPREHEN METABOLIC PANEL: CPT

## 2024-11-30 PROCEDURE — 84703 CHORIONIC GONADOTROPIN ASSAY: CPT

## 2024-11-30 PROCEDURE — 87086 URINE CULTURE/COLONY COUNT: CPT

## 2024-11-30 PROCEDURE — 87636 SARSCOV2 & INF A&B AMP PRB: CPT

## 2024-11-30 PROCEDURE — 81001 URINALYSIS AUTO W/SCOPE: CPT

## 2024-11-30 PROCEDURE — 6370000000 HC RX 637 (ALT 250 FOR IP): Performed by: EMERGENCY MEDICINE

## 2024-11-30 PROCEDURE — 85025 COMPLETE CBC W/AUTO DIFF WBC: CPT

## 2024-11-30 PROCEDURE — 74176 CT ABD & PELVIS W/O CONTRAST: CPT

## 2024-11-30 PROCEDURE — 2580000003 HC RX 258: Performed by: NURSE PRACTITIONER

## 2024-11-30 PROCEDURE — 83735 ASSAY OF MAGNESIUM: CPT

## 2024-11-30 PROCEDURE — 99284 EMERGENCY DEPT VISIT MOD MDM: CPT

## 2024-11-30 PROCEDURE — 96372 THER/PROPH/DIAG INJ SC/IM: CPT

## 2024-11-30 PROCEDURE — 6360000002 HC RX W HCPCS: Performed by: EMERGENCY MEDICINE

## 2024-11-30 PROCEDURE — 83690 ASSAY OF LIPASE: CPT

## 2024-11-30 RX ORDER — 0.9 % SODIUM CHLORIDE 0.9 %
1000 INTRAVENOUS SOLUTION INTRAVENOUS ONCE
Status: COMPLETED | OUTPATIENT
Start: 2024-11-30 | End: 2024-11-30

## 2024-11-30 RX ORDER — ONDANSETRON 4 MG/1
4 TABLET, ORALLY DISINTEGRATING ORAL ONCE
Status: COMPLETED | OUTPATIENT
Start: 2024-11-30 | End: 2024-11-30

## 2024-11-30 RX ORDER — KETOROLAC TROMETHAMINE 30 MG/ML
30 INJECTION, SOLUTION INTRAMUSCULAR; INTRAVENOUS ONCE
Status: DISCONTINUED | OUTPATIENT
Start: 2024-11-30 | End: 2024-11-30

## 2024-11-30 RX ORDER — ONDANSETRON 2 MG/ML
4 INJECTION INTRAMUSCULAR; INTRAVENOUS ONCE
Status: DISCONTINUED | OUTPATIENT
Start: 2024-11-30 | End: 2024-11-30

## 2024-11-30 RX ORDER — KETOROLAC TROMETHAMINE 30 MG/ML
30 INJECTION, SOLUTION INTRAMUSCULAR; INTRAVENOUS ONCE
Status: COMPLETED | OUTPATIENT
Start: 2024-11-30 | End: 2024-11-30

## 2024-11-30 RX ORDER — IOPAMIDOL 755 MG/ML
75 INJECTION, SOLUTION INTRAVASCULAR
Status: DISCONTINUED | OUTPATIENT
Start: 2024-11-30 | End: 2024-11-30 | Stop reason: HOSPADM

## 2024-11-30 RX ORDER — ONDANSETRON 4 MG/1
4 TABLET, FILM COATED ORAL EVERY 8 HOURS PRN
Qty: 20 TABLET | Refills: 0 | Status: SHIPPED | OUTPATIENT
Start: 2024-11-30

## 2024-11-30 RX ADMIN — ONDANSETRON 4 MG: 4 TABLET, ORALLY DISINTEGRATING ORAL at 17:10

## 2024-11-30 RX ADMIN — SODIUM CHLORIDE 1000 ML: 9 INJECTION, SOLUTION INTRAVENOUS at 15:24

## 2024-11-30 RX ADMIN — KETOROLAC TROMETHAMINE 30 MG: 30 INJECTION, SOLUTION INTRAMUSCULAR at 17:11

## 2024-11-30 ASSESSMENT — PAIN DESCRIPTION - PAIN TYPE: TYPE: ACUTE PAIN

## 2024-11-30 ASSESSMENT — PAIN DESCRIPTION - ORIENTATION: ORIENTATION: MID

## 2024-11-30 ASSESSMENT — PAIN DESCRIPTION - ONSET: ONSET: SUDDEN

## 2024-11-30 ASSESSMENT — PAIN DESCRIPTION - LOCATION
LOCATION: ABDOMEN
LOCATION: ABDOMEN;BACK

## 2024-11-30 ASSESSMENT — PAIN DESCRIPTION - DESCRIPTORS
DESCRIPTORS: CRAMPING;DISCOMFORT
DESCRIPTORS: DISCOMFORT;STABBING

## 2024-11-30 ASSESSMENT — PAIN SCALES - GENERAL
PAINLEVEL_OUTOF10: 5
PAINLEVEL_OUTOF10: 4

## 2024-11-30 ASSESSMENT — PAIN - FUNCTIONAL ASSESSMENT: PAIN_FUNCTIONAL_ASSESSMENT: 0-10

## 2024-11-30 ASSESSMENT — PAIN DESCRIPTION - FREQUENCY: FREQUENCY: CONTINUOUS

## 2024-11-30 NOTE — ED PROVIDER NOTES
[facility] EMERGENCY DEPARTMENT  EMERGENCY DEPARTMENT ENCOUNTER        Pt Name: Valeria Cummins  MRN: [id number]  Birthdate 1980  Date of evaluation: 11/30/2024  Provider: Pati Shelton DO  PCP: Inessa Ansari MD  Note Started: 4:01 PM EST 11/30/24    CHIEF COMPLAINT       Chief Complaint   Patient presents with    Abdominal Pain     Abdominal pain, vomiting, diarrhea.         HISTORY OF PRESENT ILLNESS: 1 or more Elements   History From: patient    Limitations to history : None    Valeria Cummins is a 44 y.o. adult who presents with epigastric pain radiating into his back with nausea, emesis (no blood, no bile) and diarrhea (no blood, no melans) beginning weeks-months ago.  He advises that food makes it worse and he has been unable to keep anything down today.  The complaint has been persistent, moderate in severity.  Patient denies fever/chills, sore throat, cough, congestion, chest pain, shortness of breath, edema, headache, visual disturbances, focal paresthesias, focal weakness, constipation, dysuria, hematuria, trauma, neck or back pain, rash or other complaints.      Nursing Notes were all reviewed and agreed with or any disagreements were addressed in the HPI.    REVIEW OF SYSTEMS :           Positives and Pertinent negatives as per HPI.     SURGICAL HISTORY     Past Surgical History:   Procedure Laterality Date    CARPAL TUNNEL RELEASE      LUMBAR DISCECTOMY      MASTECTOMY         CURRENTMEDICATIONS       Previous Medications    ATORVASTATIN (LIPITOR) 10 MG TABLET    Take 1 tablet by mouth daily    EMPAGLIFLOZIN-METFORMIN HCL (SYNJARDY) 5-1000 MG TABS    Take by mouth in the morning and at bedtime    HYDROCHLOROTHIAZIDE (MICROZIDE) 12.5 MG CAPSULE    Take 1 capsule by mouth 2 times daily    IBUPROFEN (ADVIL;MOTRIN) 800 MG TABLET    Take 1 tablet by mouth every 6 hours as needed for Pain    LISINOPRIL (PRINIVIL;ZESTRIL) 20 MG TABLET    Take 1 tablet by mouth  Normal appearance of the bladder.      3.  Mild atherosclerotic plaque burden in the abdominal aorta and branch   vessels.  Given patient age, suggest risk stratification.           No results found.    No results found.    PROCEDURES   none          CRITICAL CARE TIME (.cct)   none    PAST MEDICAL HISTORY/Chronic Conditions Affecting Care      has a past medical history of Degenerative disc disease, lumbar, Diabetes mellitus (HCC), and Hypertension.     EMERGENCY DEPARTMENT COURSE    Vitals:    Vitals:    11/30/24 1404 11/30/24 1425 11/30/24 1725   BP: 131/76  128/74   Pulse: (!) 113  98   Resp: 16     Temp: 98 °F (36.7 °C)     TempSrc: Oral     SpO2: 96%     Weight:  72.6 kg (160 lb)        Patient was given the following medications:  Medications   iopamidol (ISOVUE-370) 76 % injection 75 mL (has no administration in time range)   sodium chloride 0.9 % bolus 1,000 mL (0 mLs IntraVENous Stopped 11/30/24 1645)   ondansetron (ZOFRAN-ODT) disintegrating tablet 4 mg (4 mg Oral Given 11/30/24 1710)   ketorolac (TORADOL) injection 30 mg (30 mg IntraMUSCular Given 11/30/24 1711)           Is this patient to be included in the SEP-1 Core Measure due to severe sepsis or septic shock?   No Exclusion criteria - the patient is NOT to be included for SEP-1 Core Measure due to: Infection is not suspected        Medical Decision Making/Differential Diagnosis:    CC/HPI Summary, Social Determinants of health, Records Reviewed, DDx, testing done/not done, ED Course, Reassessment, disposition considerations/shared decision making with patient, consults, disposition:            MDM:   Negative acute findings on workup today.  Does have some incidental atherosclerosis of the aorta and will follow-up with vascular surgery as an outpatient.  Will follow-up with PCP as an outpatient for GI referral for possible HIDA scan, EGD, colonoscopy.  Was unable to provide stool sample for stool studies today.  Will discharge home with oral

## 2024-12-02 LAB
MICROORGANISM SPEC CULT: NO GROWTH
SERVICE CMNT-IMP: NORMAL
SPECIMEN DESCRIPTION: NORMAL